# Patient Record
(demographics unavailable — no encounter records)

---

## 2025-01-10 NOTE — PHYSICAL EXAM
[No Acute Distress] : no acute distress [Well-Appearing] : well-appearing [Normal Oropharynx] : the oropharynx was normal [Soft] : abdomen soft [Non-distended] : non-distended [No Masses] : no abdominal mass palpated [No HSM] : no HSM [Normal Bowel Sounds] : normal bowel sounds [No CVA Tenderness] : no CVA  tenderness [No Spinal Tenderness] : no spinal tenderness [No Joint Swelling] : no joint swelling [Grossly Normal Strength/Tone] : grossly normal strength/tone [Normal Gait] : normal gait [Normal] : affect was normal and insight and judgment were intact [de-identified] : mildly TTP periumbilical/LLQ [de-identified] : pain with back extension/flexion, normal back ROM, +SLR on left, strength 5/5 throughout

## 2025-01-10 NOTE — HISTORY OF PRESENT ILLNESS
[FreeTextEntry8] : 46M presents for multiple complaints. - intermittent abd pain 8/10 since November, worsened with spicy food and meat, milk causes bloating, after meals, feels like burning. no vomiting, no nausea, diarrhea, constipation. straining in the bathroom. uses omeprazole 20mg qd which helps somewhat. - erectile dysfunction for 1mo and unable to maintain an erection for longer than 1 minute at a time. he is no longer having sex d/t erectile dysfunction but was sexually active prior. pt denies having morning erections. denies change in libido. - urinary frequency every 0.5-1h for 1mo a/w weak urinary stream. no dysuria, trauma, fever, polydipsia, sensory changes. - bilateral lower back pain, 4/10 in intensity: for 3d, worse when bending forward. a/w electric senstaion down both legs, worse on left. denies acute trauma, incontinence, saddle anesthesia. pt works in construction.

## 2025-01-10 NOTE — PHYSICAL EXAM
[No Acute Distress] : no acute distress [Well-Appearing] : well-appearing [Normal Oropharynx] : the oropharynx was normal [Soft] : abdomen soft [Non-distended] : non-distended [No Masses] : no abdominal mass palpated [No HSM] : no HSM [Normal Bowel Sounds] : normal bowel sounds [No CVA Tenderness] : no CVA  tenderness [No Spinal Tenderness] : no spinal tenderness [No Joint Swelling] : no joint swelling [Grossly Normal Strength/Tone] : grossly normal strength/tone [Normal Gait] : normal gait [Normal] : affect was normal and insight and judgment were intact [de-identified] : mildly TTP periumbilical/LLQ [de-identified] : pain with back extension/flexion, normal back ROM, +SLR on left, strength 5/5 throughout

## 2025-01-10 NOTE — REVIEW OF SYSTEMS
[Abdominal Pain] : abdominal pain [Frequency] : frequency [Back Pain] : back pain [Negative] : Heme/Lymph

## 2025-01-20 NOTE — HISTORY OF PRESENT ILLNESS
[FreeTextEntry8] : 47 year old male presenting for concern for exposure to HIV infection. Reports having sexual encounters with two different woman, including current girlfriend. Has been having sexual encounters with another woman for 3 years. Woman that he has been having sexual encounters with states that she had a  that passed away with HIV infection 6-7 years ago and now reports that she is positive for HIV. Reports vaginal and oral sex with other woman about 8 days ago. Sexual activity with current girlfriend last night. Prior HIV test on 4/2023 that was negative.

## 2025-01-20 NOTE — PHYSICAL EXAM
[No Acute Distress] : no acute distress [Well Nourished] : well nourished [Normal Sclera/Conjunctiva] : normal sclera/conjunctiva [EOMI] : extraocular movements intact [Normal Outer Ear/Nose] : the outer ears and nose were normal in appearance [Normal Oropharynx] : the oropharynx was normal [Supple] : supple [No Respiratory Distress] : no respiratory distress  [No Accessory Muscle Use] : no accessory muscle use [Normal Rate] : normal rate  [Normal Gait] : normal gait [Normal Affect] : the affect was normal [Alert and Oriented x3] : oriented to person, place, and time [Normal Insight/Judgement] : insight and judgment were intact

## 2025-02-25 NOTE — HISTORY OF PRESENT ILLNESS
[FreeTextEntry1] : f/u HIV exposure [de-identified] : 47M presents for follow up. pt previously seen 1/17 after concern for HIV exposure. STI testing including HIV were negative. pt reports that he spoke to his girlfriend from whom he had the exposure and she had said it was a joke. he is however amenable to further testing.

## 2025-02-25 NOTE — HISTORY OF PRESENT ILLNESS
[FreeTextEntry1] : f/u HIV exposure [de-identified] : 47M presents for follow up. pt previously seen 1/17 after concern for HIV exposure. STI testing including HIV were negative. pt reports that he spoke to his girlfriend from whom he had the exposure and she had said it was a joke. he is however amenable to further testing.

## 2025-02-25 NOTE — HISTORY OF PRESENT ILLNESS
[FreeTextEntry1] : f/u HIV exposure [de-identified] : 47M presents for follow up. pt previously seen 1/17 after concern for HIV exposure. STI testing including HIV were negative. pt reports that he spoke to his girlfriend from whom he had the exposure and she had said it was a joke. he is however amenable to further testing.

## 2025-02-25 NOTE — HISTORY OF PRESENT ILLNESS
[FreeTextEntry1] : f/u HIV exposure [de-identified] : 47M presents for follow up. pt previously seen 1/17 after concern for HIV exposure. STI testing including HIV were negative. pt reports that he spoke to his girlfriend from whom he had the exposure and she had said it was a joke. he is however amenable to further testing.

## 2025-03-20 NOTE — PHYSICAL EXAM
[No Acute Distress] : no acute distress [Well-Appearing] : well-appearing [Normal Sclera/Conjunctiva] : normal sclera/conjunctiva [EOMI] : extraocular movements intact [Normal] : affect was normal and insight and judgment were intact

## 2025-03-21 NOTE — HISTORY OF PRESENT ILLNESS
[FreeTextEntry1] : hep B vaccine #2, HIV post exposure testing [de-identified] : 47M presents for 2nd hep B vaccine. pt also with suspected HIV exposure in January, initial testing negative. pt did not  PEP from pharmacy that was prescribed after the exposure.

## 2025-03-21 NOTE — END OF VISIT
[] : Resident [FreeTextEntry3] : Case discussed with Dr. Reed. Pep no longer effective for this patient, agree with HIV interval testing.

## 2025-03-21 NOTE — END OF VISIT
[FreeTextEntry3] : Case discussed with Dr. Reed. Pep no longer effective for this patient, agree with HIV interval testing. [] : Resident

## 2025-03-21 NOTE — HISTORY OF PRESENT ILLNESS
[FreeTextEntry1] : hep B vaccine #2, HIV post exposure testing [de-identified] : 47M presents for 2nd hep B vaccine. pt also with suspected HIV exposure in January, initial testing negative. pt did not  PEP from pharmacy that was prescribed after the exposure.

## 2025-04-02 NOTE — HISTORY OF PRESENT ILLNESS
[FreeTextEntry1] : 46 yo male presenting for follow up of erectile dysfunction and urinary straining. He says for the last month his ED has been resolved. He believes the issue was due to the stress at his job. He never picked up the Cialis. Does not desire to take any medications at this time.   Reports that he still has a slow stream with some straining, but he does feel that he empties his entire bladder.

## 2025-04-02 NOTE — REVIEW OF SYSTEMS
[Negative] : Constitutional [Dysuria] : no dysuria [Incontinence] : no incontinence [Hesitancy] : no urinary hesitancy [Nocturia] : no nocturia [Testicular Pain] : no testicular pain

## 2025-04-02 NOTE — PHYSICAL EXAM
[General Appearance - Well Developed] : well developed [Normal Appearance] : normal appearance [General Appearance - In No Acute Distress] : no acute distress [] : no respiratory distress [Oriented To Time, Place, And Person] : oriented to person, place, and time

## 2025-05-02 NOTE — HISTORY OF PRESENT ILLNESS
[de-identified] : 47M presents for f/u HIV exposure in January. initial testing in January and subsequent testing at 2mo were negative.